# Patient Record
Sex: MALE | Race: WHITE | NOT HISPANIC OR LATINO
[De-identification: names, ages, dates, MRNs, and addresses within clinical notes are randomized per-mention and may not be internally consistent; named-entity substitution may affect disease eponyms.]

---
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
## 2022-01-04 ENCOUNTER — BEHAVIORAL HEALTH (OUTPATIENT)
Dept: BEHAVIORAL HEALTH | Age: 22
End: 2022-01-04

## 2022-01-04 DIAGNOSIS — F84.0 AUTISM SPECTRUM DISORDER: ICD-10-CM

## 2022-01-04 DIAGNOSIS — F91.3 OPPOSITIONAL DEFIANT DISORDER: ICD-10-CM

## 2022-01-04 DIAGNOSIS — F90.2 ADHD (ATTENTION DEFICIT HYPERACTIVITY DISORDER), COMBINED TYPE: Primary | ICD-10-CM

## 2022-01-04 PROCEDURE — 90833 PSYTX W PT W E/M 30 MIN: CPT | Performed by: PSYCHIATRY & NEUROLOGY

## 2022-01-04 PROCEDURE — 99213 OFFICE O/P EST LOW 20 MIN: CPT | Performed by: PSYCHIATRY & NEUROLOGY

## 2022-01-04 RX ORDER — METHYLPHENIDATE HYDROCHLORIDE 10 MG/1
TABLET ORAL
Qty: 90 TABLET | Refills: 0 | Status: SHIPPED | OUTPATIENT
Start: 2022-01-04 | End: 2022-04-11 | Stop reason: SDUPTHER

## 2022-01-04 RX ORDER — METHYLPHENIDATE HYDROCHLORIDE 10 MG/1
TABLET ORAL
Qty: 90 TABLET | Refills: 0 | Status: SHIPPED | OUTPATIENT
Start: 2022-03-01 | End: 2022-04-11 | Stop reason: SDUPTHER

## 2022-01-04 RX ORDER — METHYLPHENIDATE HYDROCHLORIDE 40 MG/1
40 CAPSULE, EXTENDED RELEASE ORAL EVERY MORNING
Qty: 30 CAPSULE | Refills: 0 | Status: SHIPPED | OUTPATIENT
Start: 2022-01-04 | End: 2022-04-11 | Stop reason: SDUPTHER

## 2022-01-04 RX ORDER — METHYLPHENIDATE HYDROCHLORIDE 40 MG/1
40 CAPSULE, EXTENDED RELEASE ORAL EVERY MORNING
Qty: 30 CAPSULE | Refills: 0 | Status: SHIPPED | OUTPATIENT
Start: 2022-03-01 | End: 2022-04-11 | Stop reason: SDUPTHER

## 2022-01-04 RX ORDER — METHYLPHENIDATE HYDROCHLORIDE 10 MG/1
TABLET ORAL
Qty: 90 TABLET | Refills: 0 | Status: SHIPPED | OUTPATIENT
Start: 2022-02-01 | End: 2022-04-11 | Stop reason: SDUPTHER

## 2022-01-04 RX ORDER — METHYLPHENIDATE HYDROCHLORIDE 40 MG/1
40 CAPSULE, EXTENDED RELEASE ORAL EVERY MORNING
Qty: 30 CAPSULE | Refills: 0 | Status: SHIPPED | OUTPATIENT
Start: 2022-02-01 | End: 2022-04-11 | Stop reason: SDUPTHER

## 2022-03-08 ENCOUNTER — OFFICE VISIT (OUTPATIENT)
Dept: FAMILY MEDICINE CLINIC | Facility: CLINIC | Age: 22
End: 2022-03-08
Payer: COMMERCIAL

## 2022-03-08 VITALS
DIASTOLIC BLOOD PRESSURE: 70 MMHG | SYSTOLIC BLOOD PRESSURE: 110 MMHG | TEMPERATURE: 97 F | HEIGHT: 66.42 IN | WEIGHT: 122 LBS | BODY MASS INDEX: 19.38 KG/M2 | HEART RATE: 52 BPM | OXYGEN SATURATION: 98 %

## 2022-03-08 DIAGNOSIS — Z13.6 SCREENING FOR CARDIOVASCULAR CONDITION: ICD-10-CM

## 2022-03-08 DIAGNOSIS — R48.2 SPEECH APRAXIA: ICD-10-CM

## 2022-03-08 DIAGNOSIS — Z13.0 SCREENING FOR ENDOCRINE, NUTRITIONAL, METABOLIC AND IMMUNITY DISORDER: ICD-10-CM

## 2022-03-08 DIAGNOSIS — Z00.00 ROUTINE GENERAL MEDICAL EXAMINATION AT A HEALTH CARE FACILITY: Primary | ICD-10-CM

## 2022-03-08 DIAGNOSIS — Z23 NEED FOR VACCINATION: ICD-10-CM

## 2022-03-08 DIAGNOSIS — F90.2 ADHD (ATTENTION DEFICIT HYPERACTIVITY DISORDER), COMBINED TYPE: ICD-10-CM

## 2022-03-08 DIAGNOSIS — R62.50 DEVELOPMENTAL DELAY: ICD-10-CM

## 2022-03-08 DIAGNOSIS — Z13.29 SCREENING FOR ENDOCRINE, NUTRITIONAL, METABOLIC AND IMMUNITY DISORDER: ICD-10-CM

## 2022-03-08 DIAGNOSIS — F84.0 AUTISM SPECTRUM DISORDER: ICD-10-CM

## 2022-03-08 DIAGNOSIS — Z13.228 SCREENING FOR ENDOCRINE, NUTRITIONAL, METABOLIC AND IMMUNITY DISORDER: ICD-10-CM

## 2022-03-08 DIAGNOSIS — Z13.21 SCREENING FOR ENDOCRINE, NUTRITIONAL, METABOLIC AND IMMUNITY DISORDER: ICD-10-CM

## 2022-03-08 PROCEDURE — 3008F BODY MASS INDEX DOCD: CPT | Performed by: FAMILY MEDICINE

## 2022-03-08 PROCEDURE — 99385 PREV VISIT NEW AGE 18-39: CPT | Performed by: FAMILY MEDICINE

## 2022-03-08 PROCEDURE — 90651 9VHPV VACCINE 2/3 DOSE IM: CPT | Performed by: FAMILY MEDICINE

## 2022-03-08 PROCEDURE — 90471 IMMUNIZATION ADMIN: CPT | Performed by: FAMILY MEDICINE

## 2022-03-08 PROCEDURE — 3074F SYST BP LT 130 MM HG: CPT | Performed by: FAMILY MEDICINE

## 2022-03-08 PROCEDURE — 3078F DIAST BP <80 MM HG: CPT | Performed by: FAMILY MEDICINE

## 2022-03-08 RX ORDER — METHYLPHENIDATE HYDROCHLORIDE 40 MG/1
40 CAPSULE, EXTENDED RELEASE ORAL EVERY MORNING
COMMUNITY
Start: 2022-01-04

## 2022-03-08 RX ORDER — METHYLPHENIDATE HYDROCHLORIDE 10 MG/1
10 TABLET ORAL
COMMUNITY
Start: 2021-02-24

## 2022-04-11 DIAGNOSIS — F90.2 ADHD (ATTENTION DEFICIT HYPERACTIVITY DISORDER), COMBINED TYPE: ICD-10-CM

## 2022-04-11 RX ORDER — METHYLPHENIDATE HYDROCHLORIDE 10 MG/1
TABLET ORAL
Qty: 90 TABLET | Refills: 0 | Status: SHIPPED | OUTPATIENT
Start: 2022-04-11 | End: 2022-07-07 | Stop reason: SDUPTHER

## 2022-04-11 RX ORDER — METHYLPHENIDATE HYDROCHLORIDE 40 MG/1
40 CAPSULE, EXTENDED RELEASE ORAL EVERY MORNING
Qty: 30 CAPSULE | Refills: 0 | Status: SHIPPED | OUTPATIENT
Start: 2022-04-11 | End: 2022-05-10 | Stop reason: SDUPTHER

## 2022-05-10 DIAGNOSIS — F90.2 ADHD (ATTENTION DEFICIT HYPERACTIVITY DISORDER), COMBINED TYPE: ICD-10-CM

## 2022-05-10 RX ORDER — METHYLPHENIDATE HYDROCHLORIDE 40 MG/1
40 CAPSULE, EXTENDED RELEASE ORAL EVERY MORNING
Qty: 30 CAPSULE | Refills: 0 | Status: SHIPPED | OUTPATIENT
Start: 2022-05-10 | End: 2022-06-13 | Stop reason: SDUPTHER

## 2022-05-10 RX ORDER — METHYLPHENIDATE HYDROCHLORIDE 40 MG/1
40 CAPSULE, EXTENDED RELEASE ORAL EVERY MORNING
Qty: 30 CAPSULE | Refills: 0 | Status: SHIPPED | OUTPATIENT
Start: 2022-05-10 | End: 2022-07-07 | Stop reason: SDUPTHER

## 2022-06-13 DIAGNOSIS — F90.2 ADHD (ATTENTION DEFICIT HYPERACTIVITY DISORDER), COMBINED TYPE: ICD-10-CM

## 2022-06-13 RX ORDER — METHYLPHENIDATE HYDROCHLORIDE 40 MG/1
40 CAPSULE, EXTENDED RELEASE ORAL EVERY MORNING
Qty: 30 CAPSULE | Refills: 0 | Status: SHIPPED | OUTPATIENT
Start: 2022-06-13 | End: 2022-07-07 | Stop reason: SDUPTHER

## 2022-07-06 ENCOUNTER — TELEPHONE (OUTPATIENT)
Dept: BEHAVIORAL HEALTH | Age: 22
End: 2022-07-06

## 2022-07-07 ENCOUNTER — BEHAVIORAL HEALTH (OUTPATIENT)
Dept: BEHAVIORAL HEALTH | Age: 22
End: 2022-07-07

## 2022-07-07 DIAGNOSIS — F90.2 ADHD (ATTENTION DEFICIT HYPERACTIVITY DISORDER), COMBINED TYPE: ICD-10-CM

## 2022-07-07 DIAGNOSIS — F90.2 ATTENTION DEFICIT HYPERACTIVITY DISORDER (ADHD), COMBINED TYPE: Primary | ICD-10-CM

## 2022-07-07 DIAGNOSIS — F84.0 AUTISM SPECTRUM DISORDER: ICD-10-CM

## 2022-07-07 DIAGNOSIS — F91.3 OPPOSITIONAL DEFIANT DISORDER: ICD-10-CM

## 2022-07-07 PROCEDURE — 99213 OFFICE O/P EST LOW 20 MIN: CPT | Performed by: PSYCHIATRY & NEUROLOGY

## 2022-07-07 PROCEDURE — 90833 PSYTX W PT W E/M 30 MIN: CPT | Performed by: PSYCHIATRY & NEUROLOGY

## 2022-07-07 RX ORDER — METHYLPHENIDATE HYDROCHLORIDE 10 MG/1
TABLET ORAL
Qty: 60 TABLET | Refills: 0 | Status: SHIPPED | OUTPATIENT
Start: 2022-07-07 | End: 2022-12-07 | Stop reason: SDUPTHER

## 2022-07-07 RX ORDER — METHYLPHENIDATE HYDROCHLORIDE 10 MG/1
TABLET ORAL
Qty: 60 TABLET | Refills: 0 | Status: SHIPPED | OUTPATIENT
Start: 2022-08-04 | End: 2022-12-07 | Stop reason: SDUPTHER

## 2022-07-07 RX ORDER — METHYLPHENIDATE HYDROCHLORIDE 40 MG/1
40 CAPSULE, EXTENDED RELEASE ORAL EVERY MORNING
Qty: 30 CAPSULE | Refills: 0 | Status: SHIPPED | OUTPATIENT
Start: 2022-09-01 | End: 2022-10-13 | Stop reason: SDUPTHER

## 2022-07-07 RX ORDER — METHYLPHENIDATE HYDROCHLORIDE 10 MG/1
TABLET ORAL
Qty: 60 TABLET | Refills: 0 | Status: SHIPPED | OUTPATIENT
Start: 2022-09-01 | End: 2022-12-07 | Stop reason: SDUPTHER

## 2022-07-07 RX ORDER — METHYLPHENIDATE HYDROCHLORIDE 40 MG/1
40 CAPSULE, EXTENDED RELEASE ORAL EVERY MORNING
Qty: 30 CAPSULE | Refills: 0 | Status: SHIPPED | OUTPATIENT
Start: 2022-07-07 | End: 2023-01-17 | Stop reason: SDUPTHER

## 2022-07-07 RX ORDER — METHYLPHENIDATE HYDROCHLORIDE 40 MG/1
40 CAPSULE, EXTENDED RELEASE ORAL EVERY MORNING
Qty: 30 CAPSULE | Refills: 0 | Status: SHIPPED | OUTPATIENT
Start: 2022-08-04 | End: 2023-01-17 | Stop reason: SDUPTHER

## 2022-08-16 ENCOUNTER — TELEPHONE (OUTPATIENT)
Dept: BEHAVIORAL HEALTH | Age: 22
End: 2022-08-16

## 2022-10-13 DIAGNOSIS — F90.2 ADHD (ATTENTION DEFICIT HYPERACTIVITY DISORDER), COMBINED TYPE: ICD-10-CM

## 2022-10-14 RX ORDER — METHYLPHENIDATE HYDROCHLORIDE 40 MG/1
40 CAPSULE, EXTENDED RELEASE ORAL EVERY MORNING
Qty: 30 CAPSULE | Refills: 0 | Status: SHIPPED | OUTPATIENT
Start: 2022-10-14 | End: 2022-11-11 | Stop reason: SDUPTHER

## 2022-11-11 DIAGNOSIS — F90.2 ADHD (ATTENTION DEFICIT HYPERACTIVITY DISORDER), COMBINED TYPE: ICD-10-CM

## 2022-11-12 RX ORDER — METHYLPHENIDATE HYDROCHLORIDE 40 MG/1
40 CAPSULE, EXTENDED RELEASE ORAL EVERY MORNING
Qty: 30 CAPSULE | Refills: 0 | Status: SHIPPED | OUTPATIENT
Start: 2022-11-12 | End: 2022-12-12 | Stop reason: SDUPTHER

## 2022-11-16 ENCOUNTER — TELEPHONE (OUTPATIENT)
Dept: FAMILY MEDICINE CLINIC | Facility: CLINIC | Age: 22
End: 2022-11-16

## 2022-12-07 ENCOUNTER — TELEPHONE (OUTPATIENT)
Dept: BEHAVIORAL HEALTH | Age: 22
End: 2022-12-07

## 2022-12-07 DIAGNOSIS — F90.2 ADHD (ATTENTION DEFICIT HYPERACTIVITY DISORDER), COMBINED TYPE: ICD-10-CM

## 2022-12-07 RX ORDER — METHYLPHENIDATE HYDROCHLORIDE 10 MG/1
TABLET ORAL
Qty: 60 TABLET | Refills: 0 | Status: SHIPPED | OUTPATIENT
Start: 2022-12-07 | End: 2023-01-17 | Stop reason: SDUPTHER

## 2022-12-12 DIAGNOSIS — F90.2 ADHD (ATTENTION DEFICIT HYPERACTIVITY DISORDER), COMBINED TYPE: ICD-10-CM

## 2022-12-12 RX ORDER — METHYLPHENIDATE HYDROCHLORIDE 40 MG/1
40 CAPSULE, EXTENDED RELEASE ORAL EVERY MORNING
Qty: 30 CAPSULE | Refills: 0 | Status: SHIPPED | OUTPATIENT
Start: 2022-12-12 | End: 2023-01-12 | Stop reason: SDUPTHER

## 2022-12-13 ENCOUNTER — TELEPHONE (OUTPATIENT)
Dept: BEHAVIORAL HEALTH | Age: 22
End: 2022-12-13

## 2023-01-17 ENCOUNTER — BEHAVIORAL HEALTH (OUTPATIENT)
Dept: BEHAVIORAL HEALTH | Age: 23
End: 2023-01-17

## 2023-01-17 DIAGNOSIS — F90.2 ADHD (ATTENTION DEFICIT HYPERACTIVITY DISORDER), COMBINED TYPE: Primary | ICD-10-CM

## 2023-01-17 DIAGNOSIS — F84.0 AUTISM SPECTRUM DISORDER: ICD-10-CM

## 2023-01-17 PROCEDURE — 99214 OFFICE O/P EST MOD 30 MIN: CPT | Performed by: PSYCHIATRY & NEUROLOGY

## 2023-01-17 PROCEDURE — 90833 PSYTX W PT W E/M 30 MIN: CPT | Performed by: PSYCHIATRY & NEUROLOGY

## 2023-01-17 RX ORDER — METHYLPHENIDATE HYDROCHLORIDE 10 MG/1
10 CAPSULE, EXTENDED RELEASE ORAL EVERY MORNING
Qty: 30 CAPSULE | Refills: 0 | Status: SHIPPED | OUTPATIENT
Start: 2023-02-14 | End: 2023-05-16 | Stop reason: SDUPTHER

## 2023-01-17 RX ORDER — METHYLPHENIDATE HYDROCHLORIDE 40 MG/1
40 CAPSULE, EXTENDED RELEASE ORAL EVERY MORNING
Qty: 30 CAPSULE | Refills: 0 | Status: SHIPPED | OUTPATIENT
Start: 2023-01-17 | End: 2023-03-17 | Stop reason: SDUPTHER

## 2023-01-17 RX ORDER — METHYLPHENIDATE HYDROCHLORIDE 10 MG/1
10 TABLET ORAL 2 TIMES DAILY
Qty: 60 TABLET | Refills: 0 | Status: SHIPPED | OUTPATIENT
Start: 2023-03-14 | End: 2023-05-16 | Stop reason: SDUPTHER

## 2023-01-17 RX ORDER — METHYLPHENIDATE HYDROCHLORIDE 10 MG/1
10 CAPSULE, EXTENDED RELEASE ORAL EVERY MORNING
Qty: 30 CAPSULE | Refills: 0 | Status: SHIPPED | OUTPATIENT
Start: 2023-03-14 | End: 2023-05-16 | Stop reason: SDUPTHER

## 2023-01-17 RX ORDER — METHYLPHENIDATE HYDROCHLORIDE 10 MG/1
10 TABLET ORAL 2 TIMES DAILY
Qty: 60 TABLET | Refills: 0 | Status: SHIPPED | OUTPATIENT
Start: 2023-02-14 | End: 2023-05-16 | Stop reason: SDUPTHER

## 2023-01-17 RX ORDER — METHYLPHENIDATE HYDROCHLORIDE 10 MG/1
TABLET ORAL
Qty: 60 TABLET | Refills: 0 | Status: SHIPPED | OUTPATIENT
Start: 2023-01-17 | End: 2023-05-16 | Stop reason: SDUPTHER

## 2023-02-16 DIAGNOSIS — F90.2 ADHD (ATTENTION DEFICIT HYPERACTIVITY DISORDER), COMBINED TYPE: ICD-10-CM

## 2023-02-16 RX ORDER — METHYLPHENIDATE HYDROCHLORIDE 40 MG/1
40 CAPSULE, EXTENDED RELEASE ORAL EVERY MORNING
Qty: 30 CAPSULE | Refills: 0 | OUTPATIENT
Start: 2023-02-16

## 2023-03-17 DIAGNOSIS — F90.2 ADHD (ATTENTION DEFICIT HYPERACTIVITY DISORDER), COMBINED TYPE: ICD-10-CM

## 2023-03-17 RX ORDER — METHYLPHENIDATE HYDROCHLORIDE 40 MG/1
40 CAPSULE, EXTENDED RELEASE ORAL EVERY MORNING
Qty: 30 CAPSULE | Refills: 0 | Status: SHIPPED | OUTPATIENT
Start: 2023-03-17 | End: 2023-04-18 | Stop reason: SDUPTHER

## 2023-04-14 ENCOUNTER — TELEPHONE (OUTPATIENT)
Dept: BEHAVIORAL HEALTH | Age: 23
End: 2023-04-14

## 2023-04-14 DIAGNOSIS — F90.2 ADHD (ATTENTION DEFICIT HYPERACTIVITY DISORDER), COMBINED TYPE: ICD-10-CM

## 2023-04-18 RX ORDER — METHYLPHENIDATE HYDROCHLORIDE 40 MG/1
40 CAPSULE, EXTENDED RELEASE ORAL EVERY MORNING
Qty: 30 CAPSULE | Refills: 0 | Status: SHIPPED | OUTPATIENT
Start: 2023-05-16 | End: 2023-07-10 | Stop reason: SDUPTHER

## 2023-04-18 RX ORDER — METHYLPHENIDATE HYDROCHLORIDE 40 MG/1
40 CAPSULE, EXTENDED RELEASE ORAL EVERY MORNING
Qty: 30 CAPSULE | Refills: 0 | Status: SHIPPED | OUTPATIENT
Start: 2023-04-18 | End: 2023-06-01 | Stop reason: SDUPTHER

## 2023-05-16 DIAGNOSIS — F90.2 ADHD (ATTENTION DEFICIT HYPERACTIVITY DISORDER), COMBINED TYPE: ICD-10-CM

## 2023-05-16 RX ORDER — METHYLPHENIDATE HYDROCHLORIDE 10 MG/1
TABLET ORAL
Qty: 60 TABLET | Refills: 0 | Status: SHIPPED | OUTPATIENT
Start: 2023-05-16 | End: 2023-06-06 | Stop reason: SDUPTHER

## 2023-05-16 RX ORDER — METHYLPHENIDATE HYDROCHLORIDE 10 MG/1
10 TABLET ORAL 2 TIMES DAILY
Qty: 60 TABLET | Refills: 0 | Status: SHIPPED | OUTPATIENT
Start: 2023-05-16 | End: 2023-07-18 | Stop reason: SDUPTHER

## 2023-05-16 RX ORDER — METHYLPHENIDATE HYDROCHLORIDE 10 MG/1
10 CAPSULE, EXTENDED RELEASE ORAL EVERY MORNING
Qty: 30 CAPSULE | Refills: 0 | Status: SHIPPED | OUTPATIENT
Start: 2023-05-16 | End: 2023-06-06 | Stop reason: CLARIF

## 2023-06-01 DIAGNOSIS — F90.2 ADHD (ATTENTION DEFICIT HYPERACTIVITY DISORDER), COMBINED TYPE: ICD-10-CM

## 2023-06-01 RX ORDER — METHYLPHENIDATE HYDROCHLORIDE 40 MG/1
40 CAPSULE, EXTENDED RELEASE ORAL EVERY MORNING
Qty: 30 CAPSULE | Refills: 0 | Status: SHIPPED | OUTPATIENT
Start: 2023-06-01 | End: 2023-07-18 | Stop reason: SDUPTHER

## 2023-06-06 ENCOUNTER — TELEPHONE (OUTPATIENT)
Dept: BEHAVIORAL HEALTH | Age: 23
End: 2023-06-06

## 2023-06-06 DIAGNOSIS — F90.2 ADHD (ATTENTION DEFICIT HYPERACTIVITY DISORDER), COMBINED TYPE: ICD-10-CM

## 2023-06-06 RX ORDER — METHYLPHENIDATE HYDROCHLORIDE 10 MG/1
TABLET ORAL
Qty: 60 TABLET | Refills: 0 | Status: SHIPPED | OUTPATIENT
Start: 2023-06-06 | End: 2023-07-18 | Stop reason: SDUPTHER

## 2023-07-10 DIAGNOSIS — F90.2 ADHD (ATTENTION DEFICIT HYPERACTIVITY DISORDER), COMBINED TYPE: ICD-10-CM

## 2023-07-10 RX ORDER — METHYLPHENIDATE HYDROCHLORIDE 40 MG/1
40 CAPSULE, EXTENDED RELEASE ORAL EVERY MORNING
Qty: 30 CAPSULE | Refills: 0 | Status: SHIPPED | OUTPATIENT
Start: 2023-07-10 | End: 2023-07-18 | Stop reason: SDUPTHER

## 2023-07-18 ENCOUNTER — BEHAVIORAL HEALTH (OUTPATIENT)
Dept: BEHAVIORAL HEALTH | Age: 23
End: 2023-07-18

## 2023-07-18 ENCOUNTER — TELEPHONE (OUTPATIENT)
Dept: BEHAVIORAL HEALTH | Age: 23
End: 2023-07-18

## 2023-07-18 VITALS
HEART RATE: 81 BPM | SYSTOLIC BLOOD PRESSURE: 118 MMHG | OXYGEN SATURATION: 98 % | HEIGHT: 66 IN | BODY MASS INDEX: 19.29 KG/M2 | DIASTOLIC BLOOD PRESSURE: 72 MMHG | WEIGHT: 120 LBS

## 2023-07-18 DIAGNOSIS — F90.2 ADHD (ATTENTION DEFICIT HYPERACTIVITY DISORDER), COMBINED TYPE: Primary | ICD-10-CM

## 2023-07-18 DIAGNOSIS — F84.0 AUTISM SPECTRUM DISORDER: ICD-10-CM

## 2023-07-18 PROCEDURE — 90833 PSYTX W PT W E/M 30 MIN: CPT | Performed by: PSYCHIATRY & NEUROLOGY

## 2023-07-18 PROCEDURE — 99214 OFFICE O/P EST MOD 30 MIN: CPT | Performed by: PSYCHIATRY & NEUROLOGY

## 2023-07-18 RX ORDER — METHYLPHENIDATE HYDROCHLORIDE 40 MG/1
40 CAPSULE, EXTENDED RELEASE ORAL EVERY MORNING
Qty: 30 CAPSULE | Refills: 0 | Status: SHIPPED | OUTPATIENT
Start: 2023-08-15

## 2023-07-18 RX ORDER — METHYLPHENIDATE HYDROCHLORIDE 10 MG/1
TABLET ORAL
Qty: 30 TABLET | Refills: 0 | Status: SHIPPED | OUTPATIENT
Start: 2023-07-18

## 2023-07-18 RX ORDER — METHYLPHENIDATE HYDROCHLORIDE 10 MG/1
TABLET ORAL
Qty: 30 TABLET | Refills: 0 | Status: SHIPPED | OUTPATIENT
Start: 2023-09-12

## 2023-07-18 RX ORDER — METHYLPHENIDATE HYDROCHLORIDE 10 MG/1
TABLET ORAL
Qty: 30 TABLET | Refills: 0 | Status: SHIPPED | OUTPATIENT
Start: 2023-08-15 | End: 2023-08-23 | Stop reason: SDUPTHER

## 2023-07-18 RX ORDER — METHYLPHENIDATE HYDROCHLORIDE 40 MG/1
40 CAPSULE, EXTENDED RELEASE ORAL EVERY MORNING
Qty: 30 CAPSULE | Refills: 0 | Status: SHIPPED | OUTPATIENT
Start: 2023-09-12

## 2023-07-18 RX ORDER — METHYLPHENIDATE HYDROCHLORIDE 40 MG/1
40 CAPSULE, EXTENDED RELEASE ORAL EVERY MORNING
Qty: 30 CAPSULE | Refills: 0 | Status: SHIPPED | OUTPATIENT
Start: 2023-07-18 | End: 2023-10-06 | Stop reason: SDUPTHER

## 2023-07-21 ENCOUNTER — TELEPHONE (OUTPATIENT)
Dept: BEHAVIORAL HEALTH | Age: 23
End: 2023-07-21

## 2023-08-08 DIAGNOSIS — F90.2 ADHD (ATTENTION DEFICIT HYPERACTIVITY DISORDER), COMBINED TYPE: ICD-10-CM

## 2023-08-08 RX ORDER — METHYLPHENIDATE HYDROCHLORIDE 40 MG/1
40 CAPSULE, EXTENDED RELEASE ORAL EVERY MORNING
Qty: 30 CAPSULE | Refills: 0 | Status: CANCELLED | OUTPATIENT
Start: 2023-09-12

## 2023-08-22 ENCOUNTER — TELEPHONE (OUTPATIENT)
Dept: BEHAVIORAL HEALTH | Age: 23
End: 2023-08-22

## 2023-08-23 RX ORDER — METHYLPHENIDATE HYDROCHLORIDE 10 MG/1
TABLET ORAL
Qty: 30 TABLET | Refills: 0 | Status: SHIPPED | OUTPATIENT
Start: 2023-08-23 | End: 2023-10-19 | Stop reason: SDUPTHER

## 2023-10-06 DIAGNOSIS — F90.2 ADHD (ATTENTION DEFICIT HYPERACTIVITY DISORDER), COMBINED TYPE: ICD-10-CM

## 2023-10-06 RX ORDER — METHYLPHENIDATE HYDROCHLORIDE 40 MG/1
40 CAPSULE, EXTENDED RELEASE ORAL EVERY MORNING
Qty: 30 CAPSULE | Refills: 0 | Status: SHIPPED | OUTPATIENT
Start: 2023-10-06

## 2023-10-19 RX ORDER — METHYLPHENIDATE HYDROCHLORIDE 10 MG/1
TABLET ORAL
Qty: 30 TABLET | Refills: 0 | Status: SHIPPED | OUTPATIENT
Start: 2023-10-19 | End: 2023-11-17 | Stop reason: SDUPTHER

## 2023-11-03 ENCOUNTER — TELEPHONE (OUTPATIENT)
Dept: BEHAVIORAL HEALTH | Age: 23
End: 2023-11-03

## 2023-11-03 RX ORDER — METHYLPHENIDATE HYDROCHLORIDE 40 MG/1
40 CAPSULE, EXTENDED RELEASE ORAL EVERY MORNING
Qty: 30 CAPSULE | Refills: 0 | Status: SHIPPED | OUTPATIENT
Start: 2023-11-03 | End: 2023-12-05 | Stop reason: SDUPTHER

## 2023-11-17 RX ORDER — METHYLPHENIDATE HYDROCHLORIDE 10 MG/1
TABLET ORAL
Qty: 30 TABLET | Refills: 0 | Status: SHIPPED | OUTPATIENT
Start: 2023-11-17

## 2023-12-05 RX ORDER — METHYLPHENIDATE HYDROCHLORIDE 40 MG/1
40 CAPSULE, EXTENDED RELEASE ORAL EVERY MORNING
Qty: 30 CAPSULE | Refills: 0 | Status: SHIPPED | OUTPATIENT
Start: 2023-12-05

## 2023-12-27 RX ORDER — METHYLPHENIDATE HYDROCHLORIDE 10 MG/1
TABLET ORAL
Qty: 30 TABLET | Refills: 0 | Status: SHIPPED | OUTPATIENT
Start: 2023-12-27

## 2024-01-03 ENCOUNTER — OFFICE VISIT (OUTPATIENT)
Dept: FAMILY MEDICINE CLINIC | Facility: CLINIC | Age: 24
End: 2024-01-03
Payer: COMMERCIAL

## 2024-01-03 VITALS
BODY MASS INDEX: 19.93 KG/M2 | HEIGHT: 66 IN | WEIGHT: 124 LBS | RESPIRATION RATE: 16 BRPM | TEMPERATURE: 98 F | DIASTOLIC BLOOD PRESSURE: 70 MMHG | HEART RATE: 95 BPM | OXYGEN SATURATION: 97 % | SYSTOLIC BLOOD PRESSURE: 118 MMHG

## 2024-01-03 DIAGNOSIS — H69.92 DYSFUNCTION OF LEFT EUSTACHIAN TUBE: Primary | ICD-10-CM

## 2024-01-03 DIAGNOSIS — Z23 NEED FOR INFLUENZA VACCINATION: ICD-10-CM

## 2024-01-03 PROCEDURE — 90686 IIV4 VACC NO PRSV 0.5 ML IM: CPT | Performed by: FAMILY MEDICINE

## 2024-01-03 PROCEDURE — 90471 IMMUNIZATION ADMIN: CPT | Performed by: FAMILY MEDICINE

## 2024-01-03 PROCEDURE — 3078F DIAST BP <80 MM HG: CPT | Performed by: FAMILY MEDICINE

## 2024-01-03 PROCEDURE — 99213 OFFICE O/P EST LOW 20 MIN: CPT | Performed by: FAMILY MEDICINE

## 2024-01-03 PROCEDURE — 3074F SYST BP LT 130 MM HG: CPT | Performed by: FAMILY MEDICINE

## 2024-01-03 PROCEDURE — 3008F BODY MASS INDEX DOCD: CPT | Performed by: FAMILY MEDICINE

## 2024-01-04 NOTE — PATIENT INSTRUCTIONS
Use flonase-- 2 sprays each nostril at bedtime.  To make sure you're using it properly-- look at the floor, insert the nozzle into the nasal opening and aim the spray toward the ceiling.    Use a gentle sniff when you spray the flonase into the nostril. Avoid using a big \"sniff\" which will send the spray to the back of the throat.  Repeat on the other side.   Don't blow nose for 30 minutes after nasal spray use if possible.    If no better in 1-2 weeks, follow-up with your PCP for further evaluation.   
Attending

## 2024-01-04 NOTE — PROGRESS NOTES
CHIEF COMPLAINT:     Chief Complaint   Patient presents with    Ear Pain     Left ear pain, congestion, cough, x5 days            HPI:   Mart Fabian is a 23 year old male who presents to clinic today with complaints of fullness in the left ear. Has had for 5  days. Sx developed while flying to and from [place]. Some pain on flight home, but now no pain. Fullness has not cleared even after a few days.    Patient denies history of ear infections.  Home treatment includes nothing so far.     Associated symptoms:  Patient reports decreased hearing.  Patient denies fever. Patient denies hearing loss. Patient denies drainage. Patient denies use of Q-tips to clean the ears. Patient reports nasal congestion.     Current Outpatient Medications   Medication Sig Dispense Refill    Methylphenidate HCl ER, CD, 40 MG Oral Cap CR Take 1 capsule (40 mg total) by mouth every morning.      methylphenidate 10 MG Oral Tab Take 1 tablet (10 mg total) by mouth After lunch.       No current facility-administered medications for this visit.      Past Medical History:   Diagnosis Date    Acute bronchiolitis due to respiratory syncytial virus (RSV)     ADHD     Apraxia of speech     Asthma     Autism       Social History:  Social History     Socioeconomic History    Marital status: Single   Tobacco Use    Smoking status: Never    Smokeless tobacco: Never   Vaping Use    Vaping Use: Never used   Substance and Sexual Activity    Alcohol use: Never    Drug use: Never    Sexual activity: Never   Other Topics Concern    Caffeine Concern Yes     Comment: 1 cup of tea daily    Exercise Yes     Comment: 7 x weekly    Seat Belt Yes        REVIEW OF SYSTEMS:   GENERAL: Feeling well otherwise.    SKIN: no unusual skin lesions or rashes  HEENT: See HPI  LUNGS: No cough, shortness of breath, or wheezing.  CARDIOVASCULAR: No chest pain, palpitations  GI: No N/V/C/D. No abdominal pain.  NEURO: denies headaches or dizziness    EXAM:   /70   Pulse  95   Temp 98.2 °F (36.8 °C) (Temporal)   Resp 16   Ht 5' 6\" (1.676 m)   Wt 124 lb (56.2 kg)   SpO2 97%   BMI 20.01 kg/m²   GENERAL: well developed, well nourished,in no apparent distress  SKIN: no rashes,no suspicious lesions  HEAD: atraumatic, normocephalic  EYES: conjunctivae clear, EOM intact  EARS: Tragus non tender on palpation bilaterally. External auditory canals: patent b/l.  Right TM: pearly, no bulging, noretraction,noeffusion, bony landmarks present.  Left TM: pearly, no bulging, slight retraction,no effusion, bony landmarks present.  NOSE: nostrils patent, no exudates, nasal mucosa pink and noninflamed  THROAT: oral mucosa pink, moist. Posterior pharynx is not erythematous or injected. No exudates.  NECK: supple, non-tender  LUNGS: clear to auscultation bilaterally, no wheezes or rhonchi. Breathing is non labored.  CARDIO: RRR without murmur  EXTREMITIES: no cyanosis, clubbing or edema  LYMPH: no lymphadenopathy.      ASSESSMENT AND PLAN:   Mart Fabian is a 23 year old male who presents with:    ASSESSMENT:  Encounter Diagnoses   Name Primary?    Dysfunction of left eustachian tube Yes    Need for influenza vaccination        PLAN: Meds as listed below.  Comfort measures as described in Patient Instructions    Meds & Refills for this Visit:  Requested Prescriptions      No prescriptions requested or ordered in this encounter         Patient Instructions   Use flonase-- 2 sprays each nostril at bedtime.  To make sure you're using it properly-- look at the floor, insert the nozzle into the nasal opening and aim the spray toward the ceiling.    Use a gentle sniff when you spray the flonase into the nostril. Avoid using a big \"sniff\" which will send the spray to the back of the throat.  Repeat on the other side.   Don't blow nose for 30 minutes after nasal spray use if possible.    If no better in 1-2 weeks, follow-up with your PCP for further evaluation.       Patient voiced understand and is in  agreement with treatment plan.

## 2024-01-05 DIAGNOSIS — F90.2 ADHD (ATTENTION DEFICIT HYPERACTIVITY DISORDER), COMBINED TYPE: ICD-10-CM

## 2024-01-05 RX ORDER — METHYLPHENIDATE HYDROCHLORIDE 40 MG/1
40 CAPSULE, EXTENDED RELEASE ORAL EVERY MORNING
Qty: 30 CAPSULE | Refills: 0 | Status: SHIPPED | OUTPATIENT
Start: 2024-01-05

## 2024-01-09 DIAGNOSIS — F90.2 ADHD (ATTENTION DEFICIT HYPERACTIVITY DISORDER), COMBINED TYPE: ICD-10-CM

## 2024-01-09 RX ORDER — METHYLPHENIDATE HYDROCHLORIDE 40 MG/1
40 CAPSULE, EXTENDED RELEASE ORAL EVERY MORNING
Qty: 30 CAPSULE | Refills: 0 | Status: SHIPPED | OUTPATIENT
Start: 2024-01-09

## 2024-01-16 ENCOUNTER — APPOINTMENT (OUTPATIENT)
Dept: BEHAVIORAL HEALTH | Age: 24
End: 2024-01-16

## 2024-01-16 VITALS
OXYGEN SATURATION: 98 % | BODY MASS INDEX: 19.32 KG/M2 | SYSTOLIC BLOOD PRESSURE: 108 MMHG | HEIGHT: 67 IN | WEIGHT: 123.1 LBS | HEART RATE: 86 BPM | DIASTOLIC BLOOD PRESSURE: 60 MMHG

## 2024-01-16 DIAGNOSIS — F84.0 AUTISM SPECTRUM DISORDER: Primary | ICD-10-CM

## 2024-01-16 DIAGNOSIS — F90.2 ADHD (ATTENTION DEFICIT HYPERACTIVITY DISORDER), COMBINED TYPE: ICD-10-CM

## 2024-01-16 RX ORDER — METHYLPHENIDATE HYDROCHLORIDE 40 MG/1
40 CAPSULE, EXTENDED RELEASE ORAL EVERY MORNING
Qty: 30 CAPSULE | Refills: 0 | Status: SHIPPED | OUTPATIENT
Start: 2024-02-06

## 2024-01-16 RX ORDER — METHYLPHENIDATE HYDROCHLORIDE 20 MG/1
20 TABLET ORAL EVERY MORNING
Qty: 30 TABLET | Refills: 0 | Status: SHIPPED | OUTPATIENT
Start: 2024-01-16

## 2024-01-16 RX ORDER — METHYLPHENIDATE HYDROCHLORIDE 10 MG/1
TABLET ORAL
Qty: 30 TABLET | Refills: 0 | Status: SHIPPED | OUTPATIENT
Start: 2024-01-16

## 2024-01-16 RX ORDER — METHYLPHENIDATE HYDROCHLORIDE 5 MG/1
TABLET ORAL
Qty: 30 TABLET | Refills: 0 | Status: SHIPPED | OUTPATIENT
Start: 2024-01-16

## 2024-01-16 RX ORDER — METHYLPHENIDATE HYDROCHLORIDE 40 MG/1
40 CAPSULE, EXTENDED RELEASE ORAL EVERY MORNING
Qty: 30 CAPSULE | Refills: 0 | Status: SHIPPED | OUTPATIENT
Start: 2024-01-16

## 2024-01-16 RX ORDER — METHYLPHENIDATE HYDROCHLORIDE 40 MG/1
40 CAPSULE, EXTENDED RELEASE ORAL EVERY MORNING
Qty: 30 CAPSULE | Refills: 0 | Status: SHIPPED | OUTPATIENT
Start: 2024-03-05

## 2024-02-27 DIAGNOSIS — F90.2 ADHD (ATTENTION DEFICIT HYPERACTIVITY DISORDER), COMBINED TYPE: ICD-10-CM

## 2024-02-27 RX ORDER — METHYLPHENIDATE HYDROCHLORIDE 40 MG/1
40 CAPSULE, EXTENDED RELEASE ORAL EVERY MORNING
Qty: 30 CAPSULE | Refills: 0 | Status: SHIPPED | OUTPATIENT
Start: 2024-03-05

## 2024-02-29 RX ORDER — METHYLPHENIDATE HYDROCHLORIDE 10 MG/1
TABLET ORAL
Qty: 30 TABLET | Refills: 0 | Status: SHIPPED | OUTPATIENT
Start: 2024-02-29

## 2024-03-04 ENCOUNTER — TELEPHONE (OUTPATIENT)
Dept: BEHAVIORAL HEALTH | Age: 24
End: 2024-03-04

## 2024-04-04 RX ORDER — METHYLPHENIDATE HYDROCHLORIDE 10 MG/1
TABLET ORAL
Qty: 30 TABLET | Refills: 0 | Status: SHIPPED | OUTPATIENT
Start: 2024-04-04

## 2024-05-01 DIAGNOSIS — F90.2 ADHD (ATTENTION DEFICIT HYPERACTIVITY DISORDER), COMBINED TYPE: ICD-10-CM

## 2024-05-01 RX ORDER — METHYLPHENIDATE HYDROCHLORIDE 40 MG/1
40 CAPSULE, EXTENDED RELEASE ORAL EVERY MORNING
Qty: 30 CAPSULE | Refills: 0 | Status: SHIPPED | OUTPATIENT
Start: 2024-05-01

## 2024-05-01 RX ORDER — METHYLPHENIDATE HYDROCHLORIDE 10 MG/1
TABLET ORAL
Qty: 30 TABLET | Refills: 0 | Status: SHIPPED | OUTPATIENT
Start: 2024-05-01

## 2024-06-04 DIAGNOSIS — F90.2 ADHD (ATTENTION DEFICIT HYPERACTIVITY DISORDER), COMBINED TYPE: ICD-10-CM

## 2024-06-04 RX ORDER — METHYLPHENIDATE HYDROCHLORIDE 10 MG/1
TABLET ORAL
Qty: 30 TABLET | Refills: 0 | Status: SHIPPED | OUTPATIENT
Start: 2024-06-04

## 2024-06-04 RX ORDER — METHYLPHENIDATE HYDROCHLORIDE 40 MG/1
40 CAPSULE, EXTENDED RELEASE ORAL EVERY MORNING
Qty: 30 CAPSULE | Refills: 0 | Status: SHIPPED | OUTPATIENT
Start: 2024-06-04

## 2024-07-05 DIAGNOSIS — F90.2 ADHD (ATTENTION DEFICIT HYPERACTIVITY DISORDER), COMBINED TYPE: ICD-10-CM

## 2024-07-05 RX ORDER — METHYLPHENIDATE HYDROCHLORIDE 40 MG/1
40 CAPSULE, EXTENDED RELEASE ORAL EVERY MORNING
Qty: 30 CAPSULE | Refills: 0 | Status: SHIPPED | OUTPATIENT
Start: 2024-07-05

## 2024-07-15 RX ORDER — METHYLPHENIDATE HYDROCHLORIDE 10 MG/1
TABLET ORAL
Qty: 30 TABLET | Refills: 0 | Status: SHIPPED | OUTPATIENT
Start: 2024-07-15 | End: 2024-07-19 | Stop reason: SDUPTHER

## 2024-07-16 ENCOUNTER — APPOINTMENT (OUTPATIENT)
Dept: BEHAVIORAL HEALTH | Age: 24
End: 2024-07-16

## 2024-07-19 RX ORDER — METHYLPHENIDATE HYDROCHLORIDE 10 MG/1
TABLET ORAL
Qty: 30 TABLET | Refills: 0 | Status: SHIPPED | OUTPATIENT
Start: 2024-07-19

## 2024-07-22 ENCOUNTER — APPOINTMENT (OUTPATIENT)
Dept: BEHAVIORAL HEALTH | Age: 24
End: 2024-07-22

## 2024-07-22 DIAGNOSIS — F90.2 ADHD (ATTENTION DEFICIT HYPERACTIVITY DISORDER), COMBINED TYPE: Primary | ICD-10-CM

## 2024-07-22 DIAGNOSIS — F84.0 AUTISM SPECTRUM DISORDER (CMD): ICD-10-CM

## 2024-07-22 PROCEDURE — 99214 OFFICE O/P EST MOD 30 MIN: CPT | Performed by: PSYCHIATRY & NEUROLOGY

## 2024-07-22 PROCEDURE — 90833 PSYTX W PT W E/M 30 MIN: CPT | Performed by: PSYCHIATRY & NEUROLOGY

## 2024-07-22 RX ORDER — METHYLPHENIDATE HYDROCHLORIDE 10 MG/1
TABLET ORAL
Qty: 30 TABLET | Refills: 0 | Status: SHIPPED | OUTPATIENT
Start: 2024-07-22

## 2024-07-22 RX ORDER — METHYLPHENIDATE HYDROCHLORIDE 10 MG/1
TABLET ORAL
Qty: 30 TABLET | Refills: 0 | Status: SHIPPED | OUTPATIENT
Start: 2024-08-19

## 2024-07-22 RX ORDER — METHYLPHENIDATE HYDROCHLORIDE 40 MG/1
40 CAPSULE, EXTENDED RELEASE ORAL EVERY MORNING
Qty: 30 CAPSULE | Refills: 0 | Status: SHIPPED | OUTPATIENT
Start: 2024-08-19

## 2024-07-22 RX ORDER — METHYLPHENIDATE HYDROCHLORIDE 10 MG/1
TABLET ORAL
Qty: 30 TABLET | Refills: 0 | Status: SHIPPED | OUTPATIENT
Start: 2024-09-16

## 2024-07-22 RX ORDER — METHYLPHENIDATE HYDROCHLORIDE 40 MG/1
40 CAPSULE, EXTENDED RELEASE ORAL EVERY MORNING
Qty: 30 CAPSULE | Refills: 0 | Status: SHIPPED | OUTPATIENT
Start: 2024-07-22

## 2024-07-22 RX ORDER — METHYLPHENIDATE HYDROCHLORIDE 40 MG/1
40 CAPSULE, EXTENDED RELEASE ORAL EVERY MORNING
Qty: 30 CAPSULE | Refills: 0 | Status: SHIPPED | OUTPATIENT
Start: 2024-09-16

## 2024-07-26 ENCOUNTER — APPOINTMENT (OUTPATIENT)
Dept: BEHAVIORAL HEALTH | Age: 24
End: 2024-07-26

## 2024-08-13 ENCOUNTER — OFFICE VISIT (OUTPATIENT)
Dept: FAMILY MEDICINE CLINIC | Facility: CLINIC | Age: 24
End: 2024-08-13
Payer: COMMERCIAL

## 2024-08-13 ENCOUNTER — TELEPHONE (OUTPATIENT)
Dept: FAMILY MEDICINE CLINIC | Facility: CLINIC | Age: 24
End: 2024-08-13

## 2024-08-13 ENCOUNTER — MED REC SCAN ONLY (OUTPATIENT)
Dept: FAMILY MEDICINE CLINIC | Facility: CLINIC | Age: 24
End: 2024-08-13

## 2024-08-13 VITALS
HEART RATE: 82 BPM | RESPIRATION RATE: 18 BRPM | TEMPERATURE: 98 F | HEIGHT: 66 IN | SYSTOLIC BLOOD PRESSURE: 106 MMHG | WEIGHT: 125.19 LBS | BODY MASS INDEX: 20.12 KG/M2 | DIASTOLIC BLOOD PRESSURE: 62 MMHG | OXYGEN SATURATION: 96 %

## 2024-08-13 DIAGNOSIS — F84.0 AUTISM SPECTRUM DISORDER (HCC): ICD-10-CM

## 2024-08-13 DIAGNOSIS — Z00.00 ROUTINE GENERAL MEDICAL EXAMINATION AT A HEALTH CARE FACILITY: Primary | ICD-10-CM

## 2024-08-13 DIAGNOSIS — R62.50 DEVELOPMENTAL DELAY: ICD-10-CM

## 2024-08-13 DIAGNOSIS — F90.2 ADHD (ATTENTION DEFICIT HYPERACTIVITY DISORDER), COMBINED TYPE: ICD-10-CM

## 2024-08-13 DIAGNOSIS — Z23 NEED FOR VACCINATION: ICD-10-CM

## 2024-08-13 DIAGNOSIS — R48.2 SPEECH APRAXIA: ICD-10-CM

## 2024-08-13 PROCEDURE — 3008F BODY MASS INDEX DOCD: CPT | Performed by: FAMILY MEDICINE

## 2024-08-13 PROCEDURE — 90715 TDAP VACCINE 7 YRS/> IM: CPT | Performed by: FAMILY MEDICINE

## 2024-08-13 PROCEDURE — 3078F DIAST BP <80 MM HG: CPT | Performed by: FAMILY MEDICINE

## 2024-08-13 PROCEDURE — 90471 IMMUNIZATION ADMIN: CPT | Performed by: FAMILY MEDICINE

## 2024-08-13 PROCEDURE — 99395 PREV VISIT EST AGE 18-39: CPT | Performed by: FAMILY MEDICINE

## 2024-08-13 PROCEDURE — 3074F SYST BP LT 130 MM HG: CPT | Performed by: FAMILY MEDICINE

## 2024-08-13 NOTE — PROGRESS NOTES
Mart Fabian is a 23 year old male who is here for   Chief Complaint   Patient presents with    Wellness Visit       HPI:     1. Routine general medical examination at a health care facility  2. Need for vaccination  3. Screening for endocrine, nutritional, metabolic and immunity disorder  4. Screening for cardiovascular condition  -here for wellness    5. Speech apraxia  6. Developmental delay  7. Autism spectrum disorder  8. ADHD (attention deficit hyperactivity disorder), combined type  -is relatively high functioning  -in [employer] - this is the last year  -he hopes to graduate and work with autistic individuals  -has paperwork for special olympics, PT/OT and transportation      Screening:  Diet: tries to eat healthy  Exercise: active  Sleep: normal  Depression/Anxiety: denies    Testicular CA - counseled       History   Smoking Status    Never   Smokeless Tobacco    Never       The patient is not currently a tobacco user.  Counseling given: Not Answered      History   Alcohol Use Never       History   Drug Use Unknown         Pertinent Fam Hx:    Family History   Problem Relation Age of Onset    Diabetes Mother     Hypertension Mother     High Blood Pressure Father     Hearing loss Brother     Other (Cleft palate) Brother     Breast Cancer Maternal Grandmother     Skin cancer Maternal Grandfather     High Blood Pressure Maternal Grandfather     Other (Thyroid problem) Maternal Grandfather     Heart Attack Paternal Grandmother     Stroke Paternal Grandmother     Dementia Paternal Grandfather        Social History     Socioeconomic History    Marital status: Single   Tobacco Use    Smoking status: Never    Smokeless tobacco: Never   Vaping Use    Vaping status: Never Used   Substance and Sexual Activity    Alcohol use: Never    Drug use: Never    Sexual activity: Never   Other Topics Concern    Caffeine Concern No     Comment: 1 cup of tea daily    Stress Concern No    Weight Concern No    Special Diet  No    Exercise Yes     Comment: 7 x weekly    Seat Belt Yes       Wt Readings from Last 6 Encounters:   08/13/24 125 lb 3.2 oz (56.8 kg)   01/03/24 124 lb (56.2 kg)   03/08/22 122 lb (55.3 kg) (5%, Z= -1.63)*     * Growth percentiles are based on CDC (Boys, 2-20 Years) data.       Patient Active Problem List   Diagnosis    ADHD (attention deficit hyperactivity disorder), combined type    Autism spectrum disorder (HCC)    Speech apraxia    Developmental delay       Current Outpatient Medications on File Prior to Visit   Medication Sig Dispense Refill    Methylphenidate HCl ER, CD, 40 MG Oral Cap CR Take 1 capsule (40 mg total) by mouth every morning.      methylphenidate 10 MG Oral Tab Take 1 tablet (10 mg total) by mouth After lunch.       No current facility-administered medications on file prior to visit.       REVIEW OF SYSTEMS:     See HPI for relevant ROS  GENERAL HEALTH: no other complaints  NEURO: no other complaints  VISION: no other complaints  RESPIRATORY: no other complaints  CARDIOVASCULAR: no other complaints  GI: no other complaints  : no other complaints  SKIN: no other complaints  PSYCH: no other complaints  EXT: no other complaints    Wt Readings from Last 6 Encounters:   08/13/24 125 lb 3.2 oz (56.8 kg)   01/03/24 124 lb (56.2 kg)   03/08/22 122 lb (55.3 kg) (5%, Z= -1.63)*     * Growth percentiles are based on CDC (Boys, 2-20 Years) data.       Allergies   Allergen Reactions    Amoxicillin DIARRHEA and RASH    Penicillin G RASH       Patient Active Problem List   Diagnosis    ADHD (attention deficit hyperactivity disorder), combined type    Autism spectrum disorder (HCC)    Speech apraxia    Developmental delay       Family History   Problem Relation Age of Onset    Diabetes Mother     Hypertension Mother     High Blood Pressure Father     Hearing loss Brother     Other (Cleft palate) Brother     Breast Cancer Maternal Grandmother     Skin cancer Maternal Grandfather     High Blood Pressure  Maternal Grandfather     Other (Thyroid problem) Maternal Grandfather     Heart Attack Paternal Grandmother     Stroke Paternal Grandmother     Dementia Paternal Grandfather       Past Medical History:    Acute bronchiolitis due to respiratory syncytial virus (RSV)    ADHD    Apraxia of speech    Asthma (HCC)    Autism (HCC)      Past Surgical History:   Procedure Laterality Date    Other  2005    Bilateral myringotomy- [facility]      Social History:    Social History     Socioeconomic History    Marital status: Single   Tobacco Use    Smoking status: Never    Smokeless tobacco: Never   Vaping Use    Vaping status: Never Used   Substance and Sexual Activity    Alcohol use: Never    Drug use: Never    Sexual activity: Never   Other Topics Concern    Caffeine Concern No     Comment: 1 cup of tea daily    Stress Concern No    Weight Concern No    Special Diet No    Exercise Yes     Comment: 7 x weekly    Seat Belt Yes     Social Determinants of Health      [facility]    Social Connections    [facility]    Housing Stability           EXAM:   /62   Pulse 82   Temp 98 °F (36.7 °C) (Temporal)   Resp 18   Ht 5' 6\" (1.676 m)   Wt 125 lb 3.2 oz (56.8 kg)   SpO2 96%   BMI 20.21 kg/m²     GENERAL: A&O, in no apparent distress  HEENT: atraumatic, MMM, throat is clear  EYES: PERRLA, EOMI  NECK: supple, no thyromegaly  CHEST: no tenderness  LUNGS: clear to auscultation bilateraly, no c/w/r  CARDIO: RRR without murmurs  GI: soft, non-tender, non-distended, no appreciable hsm, bs throughout  NEURO: CN II-XII grossly intact  PSYCH: pleasant  MUSCULOSKELETAL: normal gait, no appreciable defects  EXTREMITIES: no cyanosis, clubbing or edema  SKIN: no rashes,no suspicious lesions    Problem focused exam (for problems outside of physical, if any):    The ASCVD Risk score (Steven CAMPBELL, et al.,  2019) failed to calculate for the following reasons:    The 2019 ASCVD risk score is only valid for ages 40 to 79    ASSESSMENT AND PLAN:     Health Maintenance  -We discussed the following:  Healthy diet and exercise, immunizations, and cancer screening    -Fasting labs ordered    Stress Management: counseled    1. Routine general medical examination at a health care facility  2. Need for vaccination  - HPV HUMAN PAPILLOMA VIRUS VACC 9 KELLI 3 DOSE IM    3. Screening for endocrine, nutritional, metabolic and immunity disorder  - CBC WITH DIFFERENTIAL WITH PLATELET; Future  - COMP METABOLIC PANEL (14); Future  - TSH W REFLEX TO FREE T4; Future    4. Screening for cardiovascular condition  - LIPID PANEL; Future    5. Speech apraxia  6. Developmental delay  7. Autism spectrum disorder  8. ADHD (attention deficit hyperactivity disorder), combined type  -stable  -sees psych who currently prescribes methylphenidate which has been stable  -f/u with psych as planned  -forms completed and given back to patient      Orders This Visit:  Orders Placed This Encounter   Procedures    TdaP (Boostrix/Adacel) Vaccine (> 7 Y)       Meds This Visit:  Requested Prescriptions      No prescriptions requested or ordered in this encounter       Imaging & Referrals:  TETANUS, DIPHTHERIA TOXOIDS AND ACELLULAR PERTUSIS VACCINE (TDAP), >7 YEARS, IM USE     The patient indicates understanding of these issues and agrees to the plan.  The patient is asked to return in 1 yr, sooner prn.  KELLI URIBE MD

## 2024-08-13 NOTE — PATIENT INSTRUCTIONS
Continue to eat healthy and stay active    Cleared for all special olympics    Followup in 1 yr, sooner if needed

## 2024-09-30 ENCOUNTER — PATIENT MESSAGE (OUTPATIENT)
Dept: FAMILY MEDICINE CLINIC | Facility: CLINIC | Age: 24
End: 2024-09-30

## 2024-09-30 NOTE — TELEPHONE ENCOUNTER
From: Mart Fabian  To: KELLI URIBE  Sent: 9/30/2024 10:19 AM CDT  Subject: List of Vaccinations Requested for School    Good Morning,    Nurse Lauryn at [facility] [id number] ([phone]) said she is missing Mart Fabian's list of vaccinations including the DPTH and Tetanus shots that he just had on 8/13/24.     When you have a moment today, would you please fax the proof of vaccinations to Nurse Combs at [phone].     Mart will not be allowed to attend school unless they receive proof of vaccinations. We did provide them a copy of Mart's physical but they need the updated vaccinations.     Thank you.  Nathan Fabian  [phone]

## 2024-10-02 DIAGNOSIS — F90.2 ADHD (ATTENTION DEFICIT HYPERACTIVITY DISORDER), COMBINED TYPE: ICD-10-CM

## 2024-10-02 RX ORDER — METHYLPHENIDATE HYDROCHLORIDE 40 MG/1
40 CAPSULE, EXTENDED RELEASE ORAL EVERY MORNING
Qty: 30 CAPSULE | Refills: 0 | Status: SHIPPED | OUTPATIENT
Start: 2024-10-02

## 2024-10-11 RX ORDER — METHYLPHENIDATE HYDROCHLORIDE 10 MG/1
TABLET ORAL
Qty: 30 TABLET | Refills: 0 | Status: SHIPPED | OUTPATIENT
Start: 2024-10-11

## 2024-10-31 DIAGNOSIS — F90.2 ADHD (ATTENTION DEFICIT HYPERACTIVITY DISORDER), COMBINED TYPE: ICD-10-CM

## 2024-10-31 RX ORDER — METHYLPHENIDATE HYDROCHLORIDE 40 MG/1
40 CAPSULE, EXTENDED RELEASE ORAL EVERY MORNING
Qty: 30 CAPSULE | Refills: 0 | Status: SHIPPED | OUTPATIENT
Start: 2024-10-31

## 2024-11-13 RX ORDER — METHYLPHENIDATE HYDROCHLORIDE 10 MG/1
TABLET ORAL
Qty: 30 TABLET | Refills: 0 | Status: SHIPPED | OUTPATIENT
Start: 2024-11-13

## 2024-12-02 DIAGNOSIS — F90.2 ADHD (ATTENTION DEFICIT HYPERACTIVITY DISORDER), COMBINED TYPE: ICD-10-CM

## 2024-12-02 RX ORDER — METHYLPHENIDATE HYDROCHLORIDE 40 MG/1
40 CAPSULE, EXTENDED RELEASE ORAL EVERY MORNING
Qty: 30 CAPSULE | Refills: 0 | Status: SHIPPED | OUTPATIENT
Start: 2024-12-02

## 2024-12-11 RX ORDER — METHYLPHENIDATE HYDROCHLORIDE 10 MG/1
TABLET ORAL
Qty: 30 TABLET | Refills: 0 | Status: SHIPPED | OUTPATIENT
Start: 2024-12-11

## 2024-12-31 DIAGNOSIS — F90.2 ADHD (ATTENTION DEFICIT HYPERACTIVITY DISORDER), COMBINED TYPE: ICD-10-CM

## 2024-12-31 RX ORDER — METHYLPHENIDATE HYDROCHLORIDE 40 MG/1
40 CAPSULE, EXTENDED RELEASE ORAL EVERY MORNING
Qty: 30 CAPSULE | Refills: 0 | Status: SHIPPED | OUTPATIENT
Start: 2024-12-31

## 2025-01-08 RX ORDER — METHYLPHENIDATE HYDROCHLORIDE 10 MG/1
TABLET ORAL
Qty: 30 TABLET | Refills: 0 | Status: SHIPPED | OUTPATIENT
Start: 2025-01-08

## 2025-01-23 ENCOUNTER — APPOINTMENT (OUTPATIENT)
Dept: BEHAVIORAL HEALTH | Age: 25
End: 2025-01-23

## 2025-01-23 DIAGNOSIS — F90.2 ADHD (ATTENTION DEFICIT HYPERACTIVITY DISORDER), COMBINED TYPE: ICD-10-CM

## 2025-01-23 RX ORDER — METHYLPHENIDATE HYDROCHLORIDE 40 MG/1
40 CAPSULE, EXTENDED RELEASE ORAL EVERY MORNING
Qty: 30 CAPSULE | Refills: 0 | Status: SHIPPED | OUTPATIENT
Start: 2025-01-23

## 2025-01-23 RX ORDER — METHYLPHENIDATE HYDROCHLORIDE 40 MG/1
40 CAPSULE, EXTENDED RELEASE ORAL EVERY MORNING
Qty: 30 CAPSULE | Refills: 0 | Status: SHIPPED | OUTPATIENT
Start: 2025-03-20

## 2025-01-23 RX ORDER — METHYLPHENIDATE HYDROCHLORIDE 40 MG/1
40 CAPSULE, EXTENDED RELEASE ORAL EVERY MORNING
Qty: 30 CAPSULE | Refills: 0 | Status: SHIPPED | OUTPATIENT
Start: 2025-02-20

## 2025-01-23 RX ORDER — METHYLPHENIDATE HYDROCHLORIDE 10 MG/1
TABLET ORAL
Qty: 30 TABLET | Refills: 0 | Status: SHIPPED | OUTPATIENT
Start: 2025-03-20

## 2025-01-23 RX ORDER — METHYLPHENIDATE HYDROCHLORIDE 10 MG/1
TABLET ORAL
Qty: 30 TABLET | Refills: 0 | Status: SHIPPED | OUTPATIENT
Start: 2025-01-23

## 2025-01-23 RX ORDER — METHYLPHENIDATE HYDROCHLORIDE 10 MG/1
TABLET ORAL
Qty: 30 TABLET | Refills: 0 | Status: SHIPPED | OUTPATIENT
Start: 2025-02-20

## 2025-03-18 ENCOUNTER — TELEPHONE (OUTPATIENT)
Dept: FAMILY MEDICINE CLINIC | Facility: CLINIC | Age: 25
End: 2025-03-18

## 2025-03-18 NOTE — TELEPHONE ENCOUNTER
Patient's mother called to request [employer] Physical Form be faxed to work at [phone]. [facility] school has lost form.    Physical Form has been faxed, and a confirmation fax has been received.

## 2025-04-29 DIAGNOSIS — F90.2 ADHD (ATTENTION DEFICIT HYPERACTIVITY DISORDER), COMBINED TYPE: ICD-10-CM

## 2025-04-30 RX ORDER — METHYLPHENIDATE HYDROCHLORIDE 40 MG/1
40 CAPSULE, EXTENDED RELEASE ORAL EVERY MORNING
Qty: 30 CAPSULE | Refills: 0 | Status: SHIPPED | OUTPATIENT
Start: 2025-04-30

## 2025-05-05 ENCOUNTER — TELEPHONE (OUTPATIENT)
Dept: BEHAVIORAL HEALTH | Age: 25
End: 2025-05-05

## 2025-05-05 DIAGNOSIS — F90.2 ADHD (ATTENTION DEFICIT HYPERACTIVITY DISORDER), COMBINED TYPE: ICD-10-CM

## 2025-05-05 RX ORDER — METHYLPHENIDATE HYDROCHLORIDE 40 MG/1
40 CAPSULE, EXTENDED RELEASE ORAL EVERY MORNING
Qty: 30 CAPSULE | Refills: 0 | Status: SHIPPED | OUTPATIENT
Start: 2025-05-05

## 2025-05-19 RX ORDER — METHYLPHENIDATE HYDROCHLORIDE 10 MG/1
TABLET ORAL
Qty: 30 TABLET | Refills: 0 | Status: SHIPPED | OUTPATIENT
Start: 2025-05-19

## 2025-06-05 DIAGNOSIS — F90.2 ADHD (ATTENTION DEFICIT HYPERACTIVITY DISORDER), COMBINED TYPE: ICD-10-CM

## 2025-06-05 RX ORDER — METHYLPHENIDATE HYDROCHLORIDE 40 MG/1
40 CAPSULE, EXTENDED RELEASE ORAL EVERY MORNING
Qty: 30 CAPSULE | Refills: 0 | Status: SHIPPED | OUTPATIENT
Start: 2025-06-05

## 2025-06-17 RX ORDER — METHYLPHENIDATE HYDROCHLORIDE 10 MG/1
TABLET ORAL
Qty: 30 TABLET | Refills: 0 | Status: SHIPPED | OUTPATIENT
Start: 2025-06-17

## 2025-07-02 DIAGNOSIS — F90.2 ADHD (ATTENTION DEFICIT HYPERACTIVITY DISORDER), COMBINED TYPE: ICD-10-CM

## 2025-07-02 RX ORDER — METHYLPHENIDATE HYDROCHLORIDE 40 MG/1
40 CAPSULE, EXTENDED RELEASE ORAL EVERY MORNING
Qty: 30 CAPSULE | Refills: 0 | Status: SHIPPED | OUTPATIENT
Start: 2025-07-02

## 2025-07-14 RX ORDER — METHYLPHENIDATE HYDROCHLORIDE 10 MG/1
TABLET ORAL
Qty: 30 TABLET | Refills: 0 | Status: SHIPPED | OUTPATIENT
Start: 2025-07-14

## 2025-07-31 ENCOUNTER — APPOINTMENT (OUTPATIENT)
Dept: BEHAVIORAL HEALTH | Age: 25
End: 2025-07-31

## 2025-07-31 DIAGNOSIS — F90.2 ADHD (ATTENTION DEFICIT HYPERACTIVITY DISORDER), COMBINED TYPE: ICD-10-CM

## 2025-07-31 RX ORDER — METHYLPHENIDATE HYDROCHLORIDE 10 MG/1
TABLET ORAL
Qty: 30 TABLET | Refills: 0 | Status: SHIPPED | OUTPATIENT
Start: 2025-09-25

## 2025-07-31 RX ORDER — METHYLPHENIDATE HYDROCHLORIDE 40 MG/1
40 CAPSULE, EXTENDED RELEASE ORAL EVERY MORNING
Qty: 30 CAPSULE | Refills: 0 | Status: SHIPPED | OUTPATIENT
Start: 2025-08-28

## 2025-07-31 RX ORDER — METHYLPHENIDATE HYDROCHLORIDE 40 MG/1
40 CAPSULE, EXTENDED RELEASE ORAL EVERY MORNING
Qty: 30 CAPSULE | Refills: 0 | Status: SHIPPED | OUTPATIENT
Start: 2025-09-25

## 2025-07-31 RX ORDER — METHYLPHENIDATE HYDROCHLORIDE 10 MG/1
TABLET ORAL
Qty: 30 TABLET | Refills: 0 | Status: SHIPPED | OUTPATIENT
Start: 2025-08-28

## 2025-07-31 RX ORDER — METHYLPHENIDATE HYDROCHLORIDE 40 MG/1
40 CAPSULE, EXTENDED RELEASE ORAL EVERY MORNING
Qty: 30 CAPSULE | Refills: 0 | Status: SHIPPED | OUTPATIENT
Start: 2025-07-31

## 2025-07-31 RX ORDER — METHYLPHENIDATE HYDROCHLORIDE 10 MG/1
TABLET ORAL
Qty: 30 TABLET | Refills: 0 | Status: SHIPPED | OUTPATIENT
Start: 2025-07-31

## 2025-08-20 ENCOUNTER — E-ADVICE (OUTPATIENT)
Dept: BEHAVIORAL HEALTH | Age: 25
End: 2025-08-20

## 2025-09-02 DIAGNOSIS — F90.2 ADHD (ATTENTION DEFICIT HYPERACTIVITY DISORDER), COMBINED TYPE: ICD-10-CM

## 2025-09-02 RX ORDER — METHYLPHENIDATE HYDROCHLORIDE 10 MG/1
TABLET ORAL
Qty: 30 TABLET | Refills: 0 | Status: SHIPPED | OUTPATIENT
Start: 2025-09-02

## 2025-09-02 RX ORDER — METHYLPHENIDATE HYDROCHLORIDE 40 MG/1
40 CAPSULE, EXTENDED RELEASE ORAL EVERY MORNING
Qty: 30 CAPSULE | Refills: 0 | Status: SHIPPED | OUTPATIENT
Start: 2025-09-02

## 2026-02-10 ENCOUNTER — APPOINTMENT (OUTPATIENT)
Dept: BEHAVIORAL HEALTH | Age: 26
End: 2026-02-10

## (undated) NOTE — LETTER
?  PREPARTICIPATION PHYSICAL EVALUATION  MEDICAL ELIGIBILITY FORM  [x] Medically eligible for all sports without restrictions   [] Medically eligible for all sports without restriction with recommendations for further evaluation or treatment     []Medically eligible for certain sports     [] Not medically eligible pending further evaluation   [] Not medically eligible for any sports    Recommendations:        I have examined the student named on this form and completed the preparticipation physical evaluation. The athlete does not have apparent clinical contraindications to practice and can participate in the sport(s) as outlined on this form. A copy of the physical examination findings are on record in my office and can be made available to the school at the request of the parents. If conditions  arise after the athlete has been cleared for participation, the physician may rescind the medical eligibility until the problem is resolved and the potential consequences are completely explained to the athlete (and parents or guardians).    Name of healthcare professional (print or type: KELLI URIBE MD Date: 8/13/2024     Address: [address] [place] [address], [id number] Phone: Dept: [phone]      Signature of health care professional:      SHARED EMERGENCY INFORMATION  Allergies: is allergic to amoxicillin and penicillin g.    Medications: Mart has a current medication list which includes the following prescription(s): methylphenidate hcl er (cd) and methylphenidate.     Other Information:      Emergency contacts:   Name Relationship Lgl Grd Work Phone Home Phone Mobile Phone   1. LUIS CALDERON Father    [phone]   2. INGE CALDERON Mother    [phone]         Supplemental COVID?19 questions  1. Have you had any of the following symptoms in the past 14 days?  ([place])                a)      Fever or chills Yes  No    b)      Cough Yes  No    c)       Shortness of breath or difficulty  breathing Yes  No    d)      Fatigue Yes  No    e)      Muscle or body aches Yes  No    f)       Headache Yes  No    g)      New loss of taste or smell Yes  No    h)      Sore throat Yes  No    i)       Congestion or runny nose Yes  No    j)       Nausea or vomiting Yes  No    k)      Diarrhea Yes  No    l)       Date symptoms started Yes  No    m)    Date symptoms resolved Yes  No   2. Have you ever had a positive text for COVID-19?   Yes                            No              If yes:        Date of Test ____________      Were you tested because you had symptoms? Yes  No              If yes:        a)       Date symptoms started ____________     b)      Date symptoms resolved  ____________     c)      Were you hospitalized? Yes No    d)      Did you have fever > 100.4 F Yes No                 If yes, how many days did your fever last? ____________     e)      Did you have muscle aches, chills, or lethargy? Yes No    f)       Have you had the vaccine? Yes No        Were you tested because you were exposed to someone with COVID-19, but you did not have any symptoms?  Yes No   3. Has anyone living in your household had any of the following symptoms or tested positive for COVID-19 in the past 14 days? Yes   No                                       If yes, which symptoms [] Fever or chills    []Muscle or body aches   []Nausea or vomiting        [] Sore throat     [] Headache  [] Shortness of breath or difficulty breathing   [] New loss of taste or smell   [] Congestion or runny nose   [] Cough     [] Fatigue     [] Diarrhea   4. Have you been within 6 feet for more than 15 minutes of someone with COVID-19   In the past 14 days? Yes      No                   If yes: date(s) of exposure                  5. Are you currently waiting on results from a recent COVID test?     Yes    No         Sources:  Interim Guidance on the Preparticipation Physical Examinatio... : Clinical Journal of Sport Medicine  (lww.com)  Supplemental COVID?19 Questions (lww.com)  COVID?19 Interim Guidance: Return to Sports and Physical Activity (aap.org)      ?  PREPARTICIPATION PHYSICAL EVALUATION   HISTORY FORM  Note: Complete and sign this form (with your parents if younger than 18) before your appointment.  Name: Mart Fabian YOB: 2000   Date of Examination: 8/13/2024 Sport(s):    Sex assigned at birth: male How do you identify your gender? male     List past and current medical conditions:  has a past medical history of Acute bronchiolitis due to respiratory syncytial virus (RSV), ADHD, Apraxia of speech, Asthma (HCC), and Autism (HCC).   Have you ever had surgery? If yes, list all past surgical procedures.  has a past surgical history that includes other (2005).   Medicines and supplements: List all current prescriptions, over-the-counter medicines, and supplements (herbal and nutritional). I am having Mart Fabian maintain his Methylphenidate HCl ER (CD) and methylphenidate.   Do you have any allergies? If yes, please list all your allergies (ie, medicines, pollens, food, stinging insects). is allergic to amoxicillin and penicillin g.       Patient Health Questionnaire Version 4 (PHQ-4)  Over the last 2 weeks, how often have you been bothered by any of the following problems? ([place] response.)      Not at all Several days Over half the days Nearly  every day   Feeling nervous, anxious, or on edge 0 1 2 3   Not being able to stop or control worrying 0 1 2 3   Little interest or pleasure in doing things 0 1 2 3   Feeling down, depressed, or hopeless 0 1 2 3     (A sum of ?3 is considered positive on either subscale [questions 1 and 2, or questions 3 and 4] for screening purposes.)       GENERAL QUESTIONS  (Explain “Yes” answers at the end of this form.  [place] questions if you don’t know the answer.) Yes No   Do you have any concerns that you would like to discuss with your provider? [] []   Has a provider ever  denied or restricted your participation in sports for any reason? [] []   Do you have any ongoing medical issues or recent illnesses?  [] []   HEART HEALTH QUESTIONS ABOUT YOU Yes No   Have you ever passed out or nearly passed out during or after exercise? [] []   Have you ever had discomfort, pain, tightness, or pressure in your chest during exercise? [] []   Does your heart ever race, flutter in your chest, or skip beats (irregular beats) during exercise? [] []   Has a doctor ever told you that you have any heart problems? [] []   8.     Has a doctor ever requested a test for your heart? For         example, electrocardiography (ECG) or         echocardiography. [] []    HEART HEALTH QUESTIONS ABOUT YOU        (CONTINUED) Yes No   9.  Do you get light -headed or feel shorter of breath      than your friends during exercise? [] []   10.  Have you ever had a seizure? [] []   HEART HEALTH QUESTIONS ABOUT YOUR FAMILY     Yes No   11. Has any family member or relative  of heart           problems or had an unexpected or unexplained        sudden death before age 35 years (including             drowning or unexplained car crash)? [] []   12. Does anyone in your family have a genetic heart           problem  like hypertrophic cardiomyopathy                   (HCM), Marfan syndrome, arrhythmogenic right           ventricular cardiomyopathy (ARVC), long QT               Brugada syndrome, or a catecholaminergic              polymorphic ventricular tachycardia (CPVT)? [] []   13. Has anyone in your family had a pacemaker or      an implanted defibrillation before age 35? [] []                BONE AND JOINT QUESTIONS Yes No   14.   Have you ever had a stress fracture or an injury to a bone, muscle, ligament, joint, or tendon that caused you to miss a practice or game? [] []   15.   Do you have a bone, muscle, ligament, or joint injury that bothers you? [] []   MEDICAL QUESTIONS Yes No   16.   Do you cough, wheeze, or have  difficulty breathing during or after exercise? [] []   17.   Are you missing a kidney, an eye, a testicle (males), your spleen, or any other organ? [] []   18.   Do you have groin or testicle pain or a painful bulge or hernia in the groin area? [] []   19.   Do you have any recurring skin rashes or rashes that come and go, including herpes or methicillin-resistant Staphylococcus aureus (MRSA)? [] []   20.   Have you had a concussion or head injury that caused confusion, a prolonged headache, or memory problems?  []     []       21.   Have you ever had numbness, had tingling, had weakness in your arms or legs, or been unable to move your arms or legs after being hit or falling? [] []   22.   Have you ever become ill while exercising in the heat? [] []   23.   Do you or does someone in your family have sickle cell trait or disease? [] []   24.   Have you ever had or do you have any prob- lems with your eyes or vision? [] []    MEDICAL  QUESTIONS  (CONTINUED  ) Yes No   25.    Do you worry about  your weight? [] []   26. Are you trying to or has anyone recommended that you gain or lose  Weight? [] []   27. Are you on a special diet or do you avoid certain types of foods or food groups? [] []   28.  Have you ever had an eating disorder?                 NO CLEARA [] []   FEMALES ONLY Yes No   29.  Have you ever had a menstrual period? [] []   30. How old were you when you had your first menstrual period?      Explain \"Yes\" answers here.     ______________________________________________________________________________________________________________________________________________________________________________________________________________________________________________________________________________________________________________________________________________________________________________________________________________________________________________________________________________________________________________________________________     I hereby state that, to the best of my knowledge, my answers to the questions on this form are complete and correct.    Signature of athlete:____________________________________________________________________________________________  Signature of parent or gaurdian:__________________________________________________________________________________     Date: 8/13/2024      ?  PREPARTICIPATION PHYSICAL EVALUATION   PHYSICAL EXAMINATION FORM  Name: Mart Fabian          YOB: 2000  PHYSICIAN REMINDERS  Consider additional questions on more-sensitive issues.  Do you feel stressed out or under a lot of pressure?  Do you ever feel sad, hopeless, depressed, or anxious?  Do you feel safe at your home or residence?  During the past 30 days, did you use chewing tobacco, snuff, or dip?  Do you drink alcohol or use any other drugs?  Have you ever taken anabolic steroids or used any other performance-enhancing supplement?  Have you ever taken any supplements to help you gain or lose weight or improve your performance?  Do you wear a seat belt, use a helmet, and use condoms?  Consider reviewing questions on cardiovascular symptoms (Q4-Q13 of History Form).    EXAMINATION   Height: 5' 6\" (1.676 m) (8/13/2024 10:12 AM)     Weight: 125 lb 3.2 oz (56.8 kg) (8/13/2024 10:12 AM)     BP: 106/62 (8/13/2024 10:12 AM)     Pulse: 82 (8/13/2024 10:12 AM)   Vision: R 20/      L 20/  Corrected: [] Y []  N    MEDICAL NORMAL ABNORMAL FINDINGS   Appearance  Marfan stigmata (kyphoscoliosis, high-arched palate, pectus excavatum, arachnodactyly, hyperlaxity, myopia, mitral valve prolapse [MVP], and aortic insufficiency)   [x]    []       Eyes, ears, nose, and throat  Pupils equal  Hearing   [x]  []     Lymph nodes   [x]  []   [place]arta  Murmurs (auscultation standing, auscultation supine, and ± Valsalva maneuver)   [x]  []   Lungs   [x]  []   Abdomen   [x]  []   Skin  Herpes simplex virus (HSV), lesions suggestive of methicillin-resistant Staphylococcus aureus (MRSA), or tinea corporis   [x]  []   Neurological   [x]  []   MUSCULOSKELETAL NORMAL ABNORMAL FINDINGS   Neck   [x]  []    Back   [x]  []   Shoulder and arm   [x]  []     Elbow and forearm   [x]  []     Wrist, hand, and fingers   [x]  []     Hip and thigh   [x]  []   Knee   [x]  []     Leg and ankle   [x]  []   Foot and toes   [x]  []   Functional  Double-leg squat test, single-leg squat test, and box drop or step drop test   [x]  []   Consider electrocardiography (ECG), echocardiography, referral to a cardiologist for abnormal cardiac history or examination findings, or a combination of those.  Name of healthcare professional (print or type: KELLI URIBE MD Date: 8/13/2024     Address: [address] [place] [address], [id number] Phone: Dept: [phone]     Signature:

## (undated) NOTE — LETTER
Date: 3/8/2022    Patient Name: Sravan Trammell          To Whom it may concern: The above patient was seen at the [facility] for treatment of a medical condition. This patient meets criteria for the disabilities permit application due to having Autism Spectrum Disorder, Developmental Delay, and Speech Apraxia. Without this permit, he would not be able to participate in classes or his job. He would be high risk with getting lost, injured, or worse if traveling without help or supervision due to above diagnoses.         Sincerely,    Ash Martin MD